# Patient Record
Sex: MALE | Race: WHITE | Employment: FULL TIME | URBAN - NONMETROPOLITAN AREA
[De-identification: names, ages, dates, MRNs, and addresses within clinical notes are randomized per-mention and may not be internally consistent; named-entity substitution may affect disease eponyms.]

---

## 2017-01-19 ENCOUNTER — OFFICE VISIT (OUTPATIENT)
Dept: FAMILY MEDICINE CLINIC | Age: 62
End: 2017-01-19

## 2017-01-19 ENCOUNTER — TELEPHONE (OUTPATIENT)
Dept: SURGERY | Age: 62
End: 2017-01-19

## 2017-01-19 VITALS
HEIGHT: 66 IN | RESPIRATION RATE: 20 BRPM | SYSTOLIC BLOOD PRESSURE: 124 MMHG | HEART RATE: 60 BPM | DIASTOLIC BLOOD PRESSURE: 78 MMHG | WEIGHT: 128 LBS | BODY MASS INDEX: 20.57 KG/M2

## 2017-01-19 DIAGNOSIS — Z13.9 SCREENING: ICD-10-CM

## 2017-01-19 DIAGNOSIS — E78.00 PURE HYPERCHOLESTEROLEMIA: ICD-10-CM

## 2017-01-19 DIAGNOSIS — M10.00 IDIOPATHIC GOUT, UNSPECIFIED CHRONICITY, UNSPECIFIED SITE: ICD-10-CM

## 2017-01-19 DIAGNOSIS — I10 ESSENTIAL HYPERTENSION: Primary | ICD-10-CM

## 2017-01-19 DIAGNOSIS — E55.9 VITAMIN D DEFICIENCY: ICD-10-CM

## 2017-01-19 PROCEDURE — 99214 OFFICE O/P EST MOD 30 MIN: CPT | Performed by: PHYSICIAN ASSISTANT

## 2017-01-19 RX ORDER — ALLOPURINOL 300 MG/1
300 TABLET ORAL DAILY
Qty: 90 TABLET | Refills: 1 | Status: SHIPPED | OUTPATIENT
Start: 2017-01-19 | End: 2017-11-30 | Stop reason: SDUPTHER

## 2017-01-19 RX ORDER — LOSARTAN POTASSIUM 50 MG/1
50 TABLET ORAL DAILY
Qty: 90 TABLET | Refills: 1 | Status: SHIPPED | OUTPATIENT
Start: 2017-01-19 | End: 2017-07-24 | Stop reason: SDUPTHER

## 2017-01-19 ASSESSMENT — PATIENT HEALTH QUESTIONNAIRE - PHQ9
2. FEELING DOWN, DEPRESSED OR HOPELESS: 0
1. LITTLE INTEREST OR PLEASURE IN DOING THINGS: 0
SUM OF ALL RESPONSES TO PHQ QUESTIONS 1-9: 0
SUM OF ALL RESPONSES TO PHQ9 QUESTIONS 1 & 2: 0

## 2017-01-19 ASSESSMENT — ENCOUNTER SYMPTOMS
GASTROINTESTINAL NEGATIVE: 1
CHEST TIGHTNESS: 0
WHEEZING: 0
SHORTNESS OF BREATH: 0
COUGH: 0

## 2017-01-26 ASSESSMENT — ENCOUNTER SYMPTOMS
TROUBLE SWALLOWING: 0
NAUSEA: 0
BLOOD IN STOOL: 0
ABDOMINAL DISTENTION: 0
DIARRHEA: 0
RHINORRHEA: 0
SORE THROAT: 0
SINUS PRESSURE: 0
CONSTIPATION: 0
VOMITING: 0

## 2017-06-28 DIAGNOSIS — E53.8 VITAMIN B 12 DEFICIENCY: ICD-10-CM

## 2017-06-28 RX ORDER — MENTHOL 5.8 MG/1
LOZENGE ORAL
Qty: 30 TABLET | Refills: 5 | Status: SHIPPED | OUTPATIENT
Start: 2017-06-28

## 2017-07-19 ENCOUNTER — OFFICE VISIT (OUTPATIENT)
Dept: FAMILY MEDICINE CLINIC | Age: 62
End: 2017-07-19
Payer: COMMERCIAL

## 2017-07-19 VITALS
HEIGHT: 66 IN | DIASTOLIC BLOOD PRESSURE: 86 MMHG | BODY MASS INDEX: 19.99 KG/M2 | HEART RATE: 81 BPM | WEIGHT: 124.4 LBS | OXYGEN SATURATION: 96 % | SYSTOLIC BLOOD PRESSURE: 126 MMHG

## 2017-07-19 DIAGNOSIS — Z23 NEED FOR VACCINATION: Primary | ICD-10-CM

## 2017-07-19 DIAGNOSIS — I10 ESSENTIAL HYPERTENSION: ICD-10-CM

## 2017-07-19 DIAGNOSIS — Z72.0 TOBACCO USE: ICD-10-CM

## 2017-07-19 PROCEDURE — 99214 OFFICE O/P EST MOD 30 MIN: CPT | Performed by: PHYSICIAN ASSISTANT

## 2017-07-19 ASSESSMENT — ENCOUNTER SYMPTOMS
RESPIRATORY NEGATIVE: 1
GASTROINTESTINAL NEGATIVE: 1

## 2017-07-24 DIAGNOSIS — I10 ESSENTIAL HYPERTENSION: ICD-10-CM

## 2017-07-24 DIAGNOSIS — M10.00 IDIOPATHIC GOUT, UNSPECIFIED CHRONICITY, UNSPECIFIED SITE: ICD-10-CM

## 2017-07-24 RX ORDER — LOSARTAN POTASSIUM 50 MG/1
TABLET ORAL
Qty: 90 TABLET | Refills: 1 | Status: SHIPPED | OUTPATIENT
Start: 2017-07-24 | End: 2017-11-30 | Stop reason: SDUPTHER

## 2017-07-24 RX ORDER — ALLOPURINOL 300 MG/1
300 TABLET ORAL DAILY
Qty: 90 TABLET | Refills: 1 | Status: SHIPPED | OUTPATIENT
Start: 2017-07-24

## 2017-07-30 ASSESSMENT — ENCOUNTER SYMPTOMS
CONSTIPATION: 0
CHEST TIGHTNESS: 0
SHORTNESS OF BREATH: 0
VOMITING: 0
TROUBLE SWALLOWING: 0
WHEEZING: 0
SINUS PRESSURE: 0
SORE THROAT: 0
ABDOMINAL DISTENTION: 0
DIARRHEA: 0
NAUSEA: 0
COUGH: 0
RHINORRHEA: 0

## 2017-10-30 ENCOUNTER — NURSE ONLY (OUTPATIENT)
Dept: LAB | Age: 62
End: 2017-10-30
Payer: COMMERCIAL

## 2017-10-30 ENCOUNTER — HOSPITAL ENCOUNTER (OUTPATIENT)
Dept: LAB | Age: 62
Setting detail: SPECIMEN
Discharge: HOME | End: 2017-10-30
Payer: COMMERCIAL

## 2017-10-30 DIAGNOSIS — E55.9 VITAMIN D DEFICIENCY: ICD-10-CM

## 2017-10-30 DIAGNOSIS — I10 ESSENTIAL HYPERTENSION: ICD-10-CM

## 2017-10-30 DIAGNOSIS — Z23 NEED FOR VACCINATION: ICD-10-CM

## 2017-10-30 DIAGNOSIS — F10.10 ALCOHOL ABUSE: ICD-10-CM

## 2017-10-30 DIAGNOSIS — E78.00 PURE HYPERCHOLESTEROLEMIA: ICD-10-CM

## 2017-10-30 DIAGNOSIS — M10.00 IDIOPATHIC GOUT, UNSPECIFIED CHRONICITY, UNSPECIFIED SITE: ICD-10-CM

## 2017-10-30 DIAGNOSIS — Z13.9 SCREENING FOR CONDITION: ICD-10-CM

## 2017-10-30 LAB
ABSOLUTE EOS #: 0.1 K/UL (ref 0–0.4)
ABSOLUTE IMMATURE GRANULOCYTE: ABNORMAL K/UL (ref 0–0.3)
ABSOLUTE LYMPH #: 1.8 K/UL (ref 1–4.8)
ABSOLUTE MONO #: 0.9 K/UL (ref 0.1–1.2)
ALBUMIN SERPL-MCNC: 4.6 G/DL (ref 3.5–5.2)
ALBUMIN/GLOBULIN RATIO: 1.7 (ref 1–2.5)
ALP BLD-CCNC: 92 U/L (ref 40–129)
ALT SERPL-CCNC: 23 U/L (ref 5–41)
ANION GAP SERPL CALCULATED.3IONS-SCNC: 13 MMOL/L (ref 9–17)
AST SERPL-CCNC: 25 U/L
BASOPHILS # BLD: 0 % (ref 0–2)
BASOPHILS ABSOLUTE: 0.1 K/UL (ref 0–0.2)
BILIRUB SERPL-MCNC: 0.43 MG/DL (ref 0.3–1.2)
BUN BLDV-MCNC: 12 MG/DL (ref 8–23)
BUN/CREAT BLD: 16 (ref 9–20)
CALCIUM SERPL-MCNC: 9.6 MG/DL (ref 8.6–10.4)
CHLORIDE BLD-SCNC: 103 MMOL/L (ref 98–107)
CHOLESTEROL/HDL RATIO: 2.3
CHOLESTEROL: 193 MG/DL
CO2: 27 MMOL/L (ref 20–31)
CREAT SERPL-MCNC: 0.77 MG/DL (ref 0.7–1.2)
DATE, STOOL #1: NORMAL
DATE, STOOL #2: NORMAL
DATE, STOOL #3: NORMAL
DIFFERENTIAL TYPE: ABNORMAL
EOSINOPHILS RELATIVE PERCENT: 1 % (ref 1–8)
FOLATE: 13.6 NG/ML
GFR AFRICAN AMERICAN: >60 ML/MIN
GFR NON-AFRICAN AMERICAN: >60 ML/MIN
GFR SERPL CREATININE-BSD FRML MDRD: ABNORMAL ML/MIN/{1.73_M2}
GFR SERPL CREATININE-BSD FRML MDRD: ABNORMAL ML/MIN/{1.73_M2}
GLUCOSE BLD-MCNC: 110 MG/DL (ref 70–99)
HCT VFR BLD CALC: 44.5 % (ref 41–53)
HDLC SERPL-MCNC: 85 MG/DL
HEMOCCULT SP1 STL QL: NORMAL
HEMOCCULT SP2 STL QL: NORMAL
HEMOCCULT SP3 STL QL: NEGATIVE
HEMOGLOBIN: 15 G/DL (ref 13.5–17.5)
IMMATURE GRANULOCYTES: ABNORMAL %
LDL CHOLESTEROL: 90 MG/DL (ref 0–130)
LYMPHOCYTES # BLD: 13 % (ref 15–43)
MCH RBC QN AUTO: 34.9 PG (ref 26–34)
MCHC RBC AUTO-ENTMCNC: 33.6 G/DL (ref 31–37)
MCV RBC AUTO: 103.8 FL (ref 80–100)
MONOCYTES # BLD: 7 % (ref 6–14)
PDW BLD-RTO: 13.8 % (ref 11–14.5)
PLATELET # BLD: 352 K/UL (ref 140–450)
PLATELET ESTIMATE: ABNORMAL
PMV BLD AUTO: 8.9 FL (ref 6–12)
POTASSIUM SERPL-SCNC: 4.5 MMOL/L (ref 3.7–5.3)
RBC # BLD: 4.29 M/UL (ref 4.5–5.9)
RBC # BLD: ABNORMAL 10*6/UL
SEG NEUTROPHILS: 79 % (ref 44–74)
SEGMENTED NEUTROPHILS ABSOLUTE COUNT: 10.8 K/UL (ref 1.8–7.7)
SODIUM BLD-SCNC: 143 MMOL/L (ref 135–144)
TIME, STOOL #1: NORMAL
TIME, STOOL #2: NORMAL
TIME, STOOL #3: NORMAL
TOTAL PROTEIN: 7.3 G/DL (ref 6.4–8.3)
TRIGL SERPL-MCNC: 89 MG/DL
TSH SERPL DL<=0.05 MIU/L-ACNC: 5.49 MIU/L (ref 0.3–5)
URIC ACID: 3.6 MG/DL (ref 3.4–7)
VITAMIN B-12: 566 PG/ML (ref 211–946)
VITAMIN D 25-HYDROXY: 19.7 NG/ML (ref 30–100)
VLDLC SERPL CALC-MCNC: NORMAL MG/DL (ref 1–30)
WBC # BLD: 13.6 K/UL (ref 3.5–11)
WBC # BLD: ABNORMAL 10*3/UL

## 2017-10-30 PROCEDURE — 90471 IMMUNIZATION ADMIN: CPT | Performed by: PHYSICIAN ASSISTANT

## 2017-10-30 PROCEDURE — 84443 ASSAY THYROID STIM HORMONE: CPT

## 2017-10-30 PROCEDURE — 82607 VITAMIN B-12: CPT

## 2017-10-30 PROCEDURE — 90732 PPSV23 VACC 2 YRS+ SUBQ/IM: CPT | Performed by: PHYSICIAN ASSISTANT

## 2017-10-30 PROCEDURE — 36415 COLL VENOUS BLD VENIPUNCTURE: CPT

## 2017-10-30 PROCEDURE — 82746 ASSAY OF FOLIC ACID SERUM: CPT

## 2017-10-30 PROCEDURE — 80061 LIPID PANEL: CPT

## 2017-10-30 PROCEDURE — 82274 ASSAY TEST FOR BLOOD FECAL: CPT

## 2017-10-30 PROCEDURE — 84550 ASSAY OF BLOOD/URIC ACID: CPT

## 2017-10-30 PROCEDURE — 85025 COMPLETE CBC W/AUTO DIFF WBC: CPT

## 2017-10-30 PROCEDURE — 82306 VITAMIN D 25 HYDROXY: CPT

## 2017-10-30 PROCEDURE — 80053 COMPREHEN METABOLIC PANEL: CPT

## 2017-11-30 DIAGNOSIS — R79.89 DECREASED THYROID STIMULATING HORMONE LEVEL: ICD-10-CM

## 2017-11-30 DIAGNOSIS — R73.09 ELEVATED GLUCOSE: Primary | ICD-10-CM

## 2017-11-30 DIAGNOSIS — I10 ESSENTIAL HYPERTENSION: ICD-10-CM

## 2017-11-30 DIAGNOSIS — M10.00 IDIOPATHIC GOUT, UNSPECIFIED CHRONICITY, UNSPECIFIED SITE: ICD-10-CM

## 2017-11-30 RX ORDER — LOSARTAN POTASSIUM 50 MG/1
TABLET ORAL
Qty: 90 TABLET | Refills: 1 | Status: SHIPPED | OUTPATIENT
Start: 2017-11-30 | End: 2018-01-04 | Stop reason: SDUPTHER

## 2017-11-30 RX ORDER — ALLOPURINOL 300 MG/1
300 TABLET ORAL DAILY
Qty: 90 TABLET | Refills: 1 | Status: SHIPPED | OUTPATIENT
Start: 2017-11-30 | End: 2018-01-04 | Stop reason: SDUPTHER

## 2018-01-04 ENCOUNTER — OFFICE VISIT (OUTPATIENT)
Dept: FAMILY MEDICINE CLINIC | Age: 63
End: 2018-01-04
Payer: COMMERCIAL

## 2018-01-04 VITALS
HEART RATE: 92 BPM | SYSTOLIC BLOOD PRESSURE: 120 MMHG | OXYGEN SATURATION: 97 % | BODY MASS INDEX: 20.09 KG/M2 | WEIGHT: 128 LBS | HEIGHT: 67 IN | DIASTOLIC BLOOD PRESSURE: 60 MMHG

## 2018-01-04 DIAGNOSIS — I10 ESSENTIAL HYPERTENSION: ICD-10-CM

## 2018-01-04 DIAGNOSIS — F17.200 SMOKER: Primary | ICD-10-CM

## 2018-01-04 DIAGNOSIS — M10.00 IDIOPATHIC GOUT, UNSPECIFIED CHRONICITY, UNSPECIFIED SITE: ICD-10-CM

## 2018-01-04 DIAGNOSIS — E55.9 VITAMIN D DEFICIENCY: ICD-10-CM

## 2018-01-04 DIAGNOSIS — J30.2 OTHER SEASONAL ALLERGIC RHINITIS: ICD-10-CM

## 2018-01-04 PROCEDURE — 99214 OFFICE O/P EST MOD 30 MIN: CPT | Performed by: PHYSICIAN ASSISTANT

## 2018-01-04 RX ORDER — LORATADINE 10 MG/1
10 TABLET ORAL DAILY
Qty: 90 TABLET | Refills: 1 | Status: SHIPPED | OUTPATIENT
Start: 2018-01-04

## 2018-01-04 RX ORDER — ALLOPURINOL 300 MG/1
300 TABLET ORAL DAILY
Qty: 90 TABLET | Refills: 1 | Status: SHIPPED | OUTPATIENT
Start: 2018-01-04 | End: 2018-07-06 | Stop reason: SDUPTHER

## 2018-01-04 RX ORDER — LOSARTAN POTASSIUM 50 MG/1
TABLET ORAL
Qty: 90 TABLET | Refills: 1 | Status: SHIPPED | OUTPATIENT
Start: 2018-01-04 | End: 2018-07-06 | Stop reason: SDUPTHER

## 2018-01-04 ASSESSMENT — ENCOUNTER SYMPTOMS
RESPIRATORY NEGATIVE: 1
GASTROINTESTINAL NEGATIVE: 1

## 2018-01-04 NOTE — PROGRESS NOTES
Subjective:      Patient ID: Yasmin Marquez is a 58 y.o. male. Patient is seen for six month follow on his HTN he states he is doing well and no problems. Occasionally checks his BP at [employer]  occasionally. No recent illness. Did not get a flu shot. Still working a lot and has decreased his alcohol intake but admits to drinking a few beers most nights. States it is much less than in the past.        Review of Systems   Constitutional: Negative. Negative for appetite change, chills, fatigue and fever. Energy is good despite working a lot of overtime. HENT: Negative. Negative for congestion, postnasal drip, rhinorrhea, sinus pain, sinus pressure, sneezing, sore throat and trouble swallowing. Eyes:        Wears glasses 6-7 months sees someone in [place]. Goes yearly. Respiratory: Negative. Negative for cough, chest tightness and wheezing. Cardiovascular: Negative. Negative for chest pain, palpitations and leg swelling. Gastrointestinal: Negative. Negative for blood in stool, constipation, diarrhea, nausea and vomiting. Refuses colonoscopy. Genitourinary: Negative. Musculoskeletal: Negative for arthralgias and myalgias. No new joint pain. Skin: Negative. Neurological: Negative. Psychiatric/Behavioral:        Sleep nl for him 6 hours a day. Lives with wife one dog. No caffeine. Past Medical History:   Diagnosis Date    Alcohol abuse     Chronic back pain     Gout     Osteoarthritis      Past Surgical History:   Procedure Laterality Date    MOUTH SURGERY      multiple teeth pulled     Social History     Social History    Marital status:      Spouse name: N/A    Number of children: N/A    Years of education: N/A     Occupational History    Not on file.      Social History Main Topics    Smoking status: Current Every Day Smoker     Packs/day: 1.00    Smokeless tobacco: Never Used    Alcohol use Yes      Comment: 2-3 beers a day markedly Chloride 10/30/2017 103  98 - 107 mmol/L Final    CO2 10/30/2017 27  20 - 31 mmol/L Final    Anion Gap 10/30/2017 13  9 - 17 mmol/L Final    Alkaline Phosphatase 10/30/2017 92  40 - 129 U/L Final    ALT 10/30/2017 23  5 - 41 U/L Final    AST 10/30/2017 25  <40 U/L Final    Total Bilirubin 10/30/2017 0.43  0.3 - 1.2 mg/dL Final    Total Protein 10/30/2017 7.3  6.4 - 8.3 g/dL Final    Alb 10/30/2017 4.6  3.5 - 5.2 g/dL Final    Albumin/Globulin Ratio 10/30/2017 1.7  1.0 - 2.5 Final    GFR Non- 10/30/2017 >60  >60 mL/min Final    GFR  10/30/2017 >60  >60 mL/min Final    GFR Comment 10/30/2017        Final    Comment: Average GFR for 61-76 years old:   80 mL/min/1.73sq m  Chronic Kidney Disease:   <60 mL/min/1.73sq m  Kidney failure:   <15 mL/min/1.73sq m              eGFR calculated using average adult body mass. Additional eGFR calculator   available at:        [email or web address]        Performed at [facility] [address] [facility] [id number] Jenelle Olvera [phone] [facility] GFR Staging 10/30/2017 NOT REPORTED   Final    Cholesterol 10/30/2017 193  <200 mg/dL Final    Comment:    Cholesterol Guidelines:      <200  Desirable   200-240  Borderline      >240  Undesirable         HDL 10/30/2017 85  >40 mg/dL Final    Comment:    HDL Guidelines:    <40     Undesirable   40-59    Borderline    >59     Desirable         LDL Cholesterol 10/30/2017 90  0 - 130 mg/dL Final    Comment:    LDL Guidelines:     <100    Desirable   100-129   Near to/above Desirable   130-159   Borderline      >159   Undesirable     Direct (measured) LDL and calculated LDL are not interchangeable tests.       Chol/HDL Ratio 10/30/2017 2.3  <5 Final    Triglycerides 10/30/2017 89  <150 mg/dL Final    Comment:    Triglyceride Guidelines:     <150   Desirable   150-199  Borderline   200-499  High     >499   Very high   Based on AHA Guidelines Hydroxy           Plan:      Lipids due Fall 2018  Smoking cessation strongly encouraged  Good nutrition hydration  He will be notified of all results  Further treatment based on results  Reviewed recent lab work  Lifestyle changes  Follow-up 6 months sooner if problems  Refuses colonoscopy  Was not interested in CT lung at this time    Zac Escalera received counseling on the following healthy behaviors: nutrition, exercise, medication adherence and tobacco cessation    Patient given educational materials on Hyperlipidemia, Smoking Cessation, Nutrition, Exercise and Hypertension    I have instructed Zac Escalera to complete a self tracking handout on Blood Sugars , Blood Pressures , Weights and Smoking and instructed them to bring it with them to his next appointment. Discussed use, benefit, and side effects of prescribed medications. Barriers to medication compliance addressed. All patient questions answered. Pt voiced understanding.

## 2018-01-22 ASSESSMENT — ENCOUNTER SYMPTOMS
CONSTIPATION: 0
WHEEZING: 0
VOMITING: 0
SORE THROAT: 0
BLOOD IN STOOL: 0
SINUS PRESSURE: 0
COUGH: 0
TROUBLE SWALLOWING: 0
DIARRHEA: 0
RHINORRHEA: 0
NAUSEA: 0
SINUS PAIN: 0
CHEST TIGHTNESS: 0

## 2018-06-27 ENCOUNTER — HOSPITAL ENCOUNTER (OUTPATIENT)
Dept: GENERAL RADIOLOGY | Age: 63
Discharge: HOME | End: 2018-06-29
Payer: COMMERCIAL

## 2018-06-27 DIAGNOSIS — F17.200 SMOKER: ICD-10-CM

## 2018-06-27 PROCEDURE — 71046 X-RAY EXAM CHEST 2 VIEWS: CPT

## 2018-07-06 ENCOUNTER — OFFICE VISIT (OUTPATIENT)
Dept: FAMILY MEDICINE CLINIC | Age: 63
End: 2018-07-06
Payer: COMMERCIAL

## 2018-07-06 VITALS
OXYGEN SATURATION: 99 % | RESPIRATION RATE: 16 BRPM | HEIGHT: 66 IN | WEIGHT: 119 LBS | HEART RATE: 104 BPM | SYSTOLIC BLOOD PRESSURE: 136 MMHG | BODY MASS INDEX: 19.13 KG/M2 | DIASTOLIC BLOOD PRESSURE: 62 MMHG

## 2018-07-06 DIAGNOSIS — Z13.9 SCREENING FOR CONDITION: Primary | ICD-10-CM

## 2018-07-06 DIAGNOSIS — M10.00 IDIOPATHIC GOUT, UNSPECIFIED CHRONICITY, UNSPECIFIED SITE: ICD-10-CM

## 2018-07-06 DIAGNOSIS — E53.8 B12 DEFICIENCY: ICD-10-CM

## 2018-07-06 DIAGNOSIS — I10 ESSENTIAL HYPERTENSION: ICD-10-CM

## 2018-07-06 PROCEDURE — 99214 OFFICE O/P EST MOD 30 MIN: CPT | Performed by: PHYSICIAN ASSISTANT

## 2018-07-06 RX ORDER — LOSARTAN POTASSIUM 50 MG/1
TABLET ORAL
Qty: 90 TABLET | Refills: 1 | Status: SHIPPED | OUTPATIENT
Start: 2018-07-06

## 2018-07-06 RX ORDER — ALLOPURINOL 300 MG/1
300 TABLET ORAL DAILY
Qty: 90 TABLET | Refills: 1 | Status: SHIPPED | OUTPATIENT
Start: 2018-07-06

## 2018-07-06 ASSESSMENT — PATIENT HEALTH QUESTIONNAIRE - PHQ9
SUM OF ALL RESPONSES TO PHQ QUESTIONS 1-9: 0
2. FEELING DOWN, DEPRESSED OR HOPELESS: 0
SUM OF ALL RESPONSES TO PHQ9 QUESTIONS 1 & 2: 0
1. LITTLE INTEREST OR PLEASURE IN DOING THINGS: 0

## 2018-07-06 ASSESSMENT — ENCOUNTER SYMPTOMS
RESPIRATORY NEGATIVE: 1
GASTROINTESTINAL NEGATIVE: 1

## 2018-07-09 ASSESSMENT — ENCOUNTER SYMPTOMS
SORE THROAT: 0
SHORTNESS OF BREATH: 0
WHEEZING: 0
CONSTIPATION: 0
VOMITING: 0
COUGH: 0
NAUSEA: 0
SINUS PRESSURE: 0
CHEST TIGHTNESS: 0
TROUBLE SWALLOWING: 0
DIARRHEA: 0

## 2018-07-11 ENCOUNTER — HOSPITAL ENCOUNTER (OUTPATIENT)
Dept: LAB | Age: 63
Setting detail: SPECIMEN
Discharge: HOME | End: 2018-07-11
Payer: COMMERCIAL

## 2018-07-11 DIAGNOSIS — E53.8 B12 DEFICIENCY: ICD-10-CM

## 2018-07-11 DIAGNOSIS — I10 ESSENTIAL HYPERTENSION: ICD-10-CM

## 2018-07-11 DIAGNOSIS — M10.00 IDIOPATHIC GOUT, UNSPECIFIED CHRONICITY, UNSPECIFIED SITE: ICD-10-CM

## 2018-07-11 DIAGNOSIS — R79.89 DECREASED THYROID STIMULATING HORMONE LEVEL: ICD-10-CM

## 2018-07-11 DIAGNOSIS — Z13.9 SCREENING FOR CONDITION: ICD-10-CM

## 2018-07-11 DIAGNOSIS — F17.200 SMOKER: ICD-10-CM

## 2018-07-11 DIAGNOSIS — E55.9 VITAMIN D DEFICIENCY: ICD-10-CM

## 2018-07-11 DIAGNOSIS — R73.09 ELEVATED GLUCOSE: ICD-10-CM

## 2018-07-11 LAB
ABSOLUTE EOS #: 0.2 K/UL (ref 0–0.4)
ABSOLUTE IMMATURE GRANULOCYTE: ABNORMAL K/UL (ref 0–0.3)
ABSOLUTE LYMPH #: 1.8 K/UL (ref 1–4.8)
ABSOLUTE MONO #: 0.5 K/UL (ref 0.1–1.2)
ANION GAP SERPL CALCULATED.3IONS-SCNC: 15 MMOL/L (ref 9–17)
BASOPHILS # BLD: 1 % (ref 0–2)
BASOPHILS ABSOLUTE: 0 K/UL (ref 0–0.2)
BUN BLDV-MCNC: 13 MG/DL (ref 8–23)
BUN/CREAT BLD: 15 (ref 9–20)
CALCIUM SERPL-MCNC: 9.1 MG/DL (ref 8.6–10.4)
CHLORIDE BLD-SCNC: 101 MMOL/L (ref 98–107)
CO2: 23 MMOL/L (ref 20–31)
CREAT SERPL-MCNC: 0.87 MG/DL (ref 0.7–1.2)
DIFFERENTIAL TYPE: ABNORMAL
EOSINOPHILS RELATIVE PERCENT: 2 % (ref 1–8)
ESTIMATED AVERAGE GLUCOSE: 91 MG/DL
FOLATE: 10.8 NG/ML
GFR AFRICAN AMERICAN: >60 ML/MIN
GFR NON-AFRICAN AMERICAN: >60 ML/MIN
GFR SERPL CREATININE-BSD FRML MDRD: ABNORMAL ML/MIN/{1.73_M2}
GFR SERPL CREATININE-BSD FRML MDRD: ABNORMAL ML/MIN/{1.73_M2}
GLUCOSE BLD-MCNC: 125 MG/DL (ref 70–99)
HBA1C MFR BLD: 4.8 % (ref 4.8–5.9)
HCT VFR BLD CALC: 42.5 % (ref 41–53)
HEMOGLOBIN: 14.4 G/DL (ref 13.5–17.5)
IMMATURE GRANULOCYTES: ABNORMAL %
LYMPHOCYTES # BLD: 25 % (ref 15–43)
MCH RBC QN AUTO: 35.5 PG (ref 26–34)
MCHC RBC AUTO-ENTMCNC: 33.9 G/DL (ref 31–37)
MCV RBC AUTO: 104.9 FL (ref 80–100)
MONOCYTES # BLD: 7 % (ref 6–14)
NRBC AUTOMATED: ABNORMAL PER 100 WBC
PDW BLD-RTO: 13.7 % (ref 11–14.5)
PLATELET # BLD: 336 K/UL (ref 140–450)
PLATELET ESTIMATE: ABNORMAL
PMV BLD AUTO: 8 FL (ref 6–12)
POTASSIUM SERPL-SCNC: 4.2 MMOL/L (ref 3.7–5.3)
PROSTATE SPECIFIC ANTIGEN: 0.32 UG/L
RBC # BLD: 4.05 M/UL (ref 4.5–5.9)
RBC # BLD: ABNORMAL 10*6/UL
SEG NEUTROPHILS: 65 % (ref 44–74)
SEGMENTED NEUTROPHILS ABSOLUTE COUNT: 4.7 K/UL (ref 1.8–7.7)
SODIUM BLD-SCNC: 139 MMOL/L (ref 135–144)
THYROXINE, FREE: 1.06 NG/DL (ref 0.93–1.7)
TSH SERPL DL<=0.05 MIU/L-ACNC: 6.17 MIU/L (ref 0.3–5)
URIC ACID: 3.3 MG/DL (ref 3.4–7)
VITAMIN B-12: 502 PG/ML (ref 232–1245)
VITAMIN D 25-HYDROXY: 25.9 NG/ML (ref 30–100)
WBC # BLD: 7.2 K/UL (ref 3.5–11)
WBC # BLD: ABNORMAL 10*3/UL

## 2018-07-11 PROCEDURE — 36415 COLL VENOUS BLD VENIPUNCTURE: CPT

## 2018-07-11 PROCEDURE — 82306 VITAMIN D 25 HYDROXY: CPT

## 2018-07-11 PROCEDURE — 80048 BASIC METABOLIC PNL TOTAL CA: CPT

## 2018-07-11 PROCEDURE — 84550 ASSAY OF BLOOD/URIC ACID: CPT

## 2018-07-11 PROCEDURE — 84443 ASSAY THYROID STIM HORMONE: CPT

## 2018-07-11 PROCEDURE — 82746 ASSAY OF FOLIC ACID SERUM: CPT

## 2018-07-11 PROCEDURE — 84439 ASSAY OF FREE THYROXINE: CPT

## 2018-07-11 PROCEDURE — 83036 HEMOGLOBIN GLYCOSYLATED A1C: CPT

## 2018-07-11 PROCEDURE — 85025 COMPLETE CBC W/AUTO DIFF WBC: CPT

## 2018-07-11 PROCEDURE — 82607 VITAMIN B-12: CPT

## 2018-07-11 PROCEDURE — G0103 PSA SCREENING: HCPCS

## 2018-07-12 DIAGNOSIS — R79.89 ELEVATED TSH: Primary | ICD-10-CM

## 2018-07-12 DIAGNOSIS — E03.9 ACQUIRED HYPOTHYROIDISM: Primary | ICD-10-CM

## 2018-07-12 DIAGNOSIS — E55.9 VITAMIN D DEFICIENCY: ICD-10-CM

## 2018-07-12 RX ORDER — LEVOTHYROXINE SODIUM 0.03 MG/1
25 TABLET ORAL DAILY
Qty: 30 TABLET | Refills: 5 | Status: SHIPPED | OUTPATIENT
Start: 2018-07-12

## 2018-10-12 ENCOUNTER — HOSPITAL ENCOUNTER (OUTPATIENT)
Dept: LAB | Age: 63
Discharge: HOME | End: 2018-10-12
Payer: COMMERCIAL

## 2018-10-12 DIAGNOSIS — E55.9 VITAMIN D DEFICIENCY: ICD-10-CM

## 2018-10-12 DIAGNOSIS — R79.89 ELEVATED TSH: ICD-10-CM

## 2018-10-12 LAB
THYROXINE, FREE: 1.04 NG/DL (ref 0.93–1.7)
TSH SERPL DL<=0.05 MIU/L-ACNC: 2.26 MIU/L (ref 0.3–5)
VITAMIN D 25-HYDROXY: 28.7 NG/ML (ref 30–100)

## 2018-10-12 PROCEDURE — 82306 VITAMIN D 25 HYDROXY: CPT

## 2018-10-12 PROCEDURE — 84439 ASSAY OF FREE THYROXINE: CPT

## 2018-10-12 PROCEDURE — 36415 COLL VENOUS BLD VENIPUNCTURE: CPT

## 2018-10-12 PROCEDURE — 84443 ASSAY THYROID STIM HORMONE: CPT

## 2018-12-04 ENCOUNTER — TELEPHONE (OUTPATIENT)
Dept: FAMILY MEDICINE CLINIC | Age: 63
End: 2018-12-04